# Patient Record
Sex: MALE | URBAN - METROPOLITAN AREA
[De-identification: names, ages, dates, MRNs, and addresses within clinical notes are randomized per-mention and may not be internally consistent; named-entity substitution may affect disease eponyms.]

---
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
## 2022-01-17 ENCOUNTER — OFFICE VISIT (OUTPATIENT)
Dept: URBAN - METROPOLITAN AREA CLINIC 3 | Facility: CLINIC | Age: 22
End: 2022-01-17
Payer: MEDICAID

## 2022-01-17 DIAGNOSIS — H52.13 MYOPIA, BILATERAL: ICD-10-CM

## 2022-01-17 DIAGNOSIS — Z01.00 ENCOUNTER FOR EXAM OF EYE W/O ABNORMAL FINDING: Primary | ICD-10-CM

## 2022-01-17 PROCEDURE — 92014 COMPRE OPH EXAM EST PT 1/>: CPT | Performed by: OPTOMETRIST

## 2022-01-17 ASSESSMENT — INTRAOCULAR PRESSURE
OS: 17
OD: 16

## 2022-01-17 ASSESSMENT — VISUAL ACUITY
OS: 20/20
OD: 20/20

## 2022-01-17 NOTE — IMPRESSION/PLAN
Impression: Myopia, bilateral: H52.13. Plan: Prescription for glasses given today. Patient educated about adaptation to new glasses. Dilated exam shows no abnormalities.

## 2022-01-24 ENCOUNTER — OFFICE VISIT (OUTPATIENT)
Dept: URBAN - METROPOLITAN AREA CLINIC 4 | Facility: CLINIC | Age: 22
End: 2022-01-24
Payer: MEDICAID

## 2022-01-24 ASSESSMENT — KERATOMETRY
OS: 44.38
OD: 44.13

## 2022-01-24 ASSESSMENT — INTRAOCULAR PRESSURE
OD: 15
OS: 15

## 2022-02-03 ENCOUNTER — REFRACTIVE (OUTPATIENT)
Dept: URBAN - METROPOLITAN AREA CLINIC 4 | Facility: CLINIC | Age: 22
End: 2022-02-03
Payer: MEDICAID

## 2022-02-03 DIAGNOSIS — H53.8 OTHER VISUAL DISTURBANCES: Primary | ICD-10-CM

## 2022-02-03 ASSESSMENT — KERATOMETRY
OD: 44.38
OS: 44.88

## 2022-02-03 ASSESSMENT — VISUAL ACUITY
OS: 20/20
OD: 20/20

## 2022-02-04 ENCOUNTER — REFRACTIVE (OUTPATIENT)
Dept: URBAN - METROPOLITAN AREA CLINIC 4 | Facility: CLINIC | Age: 22
End: 2022-02-04

## 2022-02-05 ENCOUNTER — POST-OPERATIVE VISIT (OUTPATIENT)
Dept: URBAN - METROPOLITAN AREA CLINIC 4 | Facility: CLINIC | Age: 22
End: 2022-02-05
Payer: MEDICAID

## 2022-02-05 PROCEDURE — 99024 POSTOP FOLLOW-UP VISIT: CPT | Performed by: OPHTHALMOLOGY

## 2022-02-05 NOTE — IMPRESSION/PLAN
Impression: S/P LASIK - [facility] OU - 1 Day. Encounter for surgical aftercare following surgery on a sense organ  Z48.810. Plan: POD#1 s/p LASIK both eyes- healing well, flap intact. Continue Pred-Moxi eye drops QID until next visit. Cont. Vitamin C 1,000mg PO QD, sunglasses protection and preservative free artificial tears [id number] while awake until next visit. Patient instructed to continue precautions of not rubbing the eyes, to sleep with protective goggles until the next visit. F/U in 1 week, sooner PRN.

## 2022-02-14 ENCOUNTER — POST-OPERATIVE VISIT (OUTPATIENT)
Dept: URBAN - METROPOLITAN AREA CLINIC 4 | Facility: CLINIC | Age: 22
End: 2022-02-14

## 2022-02-14 DIAGNOSIS — Z48.810 ENCOUNTER FOR SURGICAL AFTERCARE FOLLOWING SURGERY ON A SENSE ORGAN: Primary | ICD-10-CM

## 2022-02-14 PROCEDURE — 99024 POSTOP FOLLOW-UP VISIT: CPT | Performed by: OPHTHALMOLOGY

## 2022-02-14 ASSESSMENT — INTRAOCULAR PRESSURE
OS: 10
OD: 10

## 2022-02-14 NOTE — IMPRESSION/PLAN
Impression: S/P LASIK - [facility]vue OU - 10 Days. Encounter for surgical aftercare following surgery on a sense organ  Z48.810. Plan: POW#1 S/P LASIK OU- healing well, right flap with microstriae and has small lint fiber under flap left eye, will elect observation. Cont. Pred-Moxi QD until runs out. Continue Vitamin C 1,000mg PO QD, sunglasses protection and preservative free artificial tears but decrease to every 1 hour while awake and plan to taper one hour per week until next visit. Stop sleeping with goggles at night and can start to gently rub the eyes. F/U in 3-4 weeks, sooner PRN.